# Patient Record
Sex: FEMALE | Race: WHITE | Employment: FULL TIME | URBAN - METROPOLITAN AREA
[De-identification: names, ages, dates, MRNs, and addresses within clinical notes are randomized per-mention and may not be internally consistent; named-entity substitution may affect disease eponyms.]

---

## 2017-02-06 ENCOUNTER — OFFICE VISIT (OUTPATIENT)
Dept: FAMILY MEDICINE CLINIC | Age: 62
End: 2017-02-06

## 2017-02-06 ENCOUNTER — TELEPHONE (OUTPATIENT)
Dept: FAMILY MEDICINE CLINIC | Age: 62
End: 2017-02-06

## 2017-02-06 VITALS
HEART RATE: 83 BPM | BODY MASS INDEX: 24.12 KG/M2 | DIASTOLIC BLOOD PRESSURE: 78 MMHG | TEMPERATURE: 98.5 F | WEIGHT: 144.8 LBS | OXYGEN SATURATION: 98 % | RESPIRATION RATE: 22 BRPM | SYSTOLIC BLOOD PRESSURE: 120 MMHG | HEIGHT: 65 IN

## 2017-02-06 DIAGNOSIS — M54.41 ACUTE RIGHT-SIDED LOW BACK PAIN WITH RIGHT-SIDED SCIATICA: Primary | ICD-10-CM

## 2017-02-06 RX ORDER — ALENDRONATE SODIUM 70 MG/1
TABLET ORAL
COMMUNITY

## 2017-02-06 RX ORDER — HYDROCODONE BITARTRATE AND ACETAMINOPHEN 5; 325 MG/1; MG/1
1 TABLET ORAL
Qty: 30 TAB | Refills: 0 | Status: SHIPPED | OUTPATIENT
Start: 2017-02-06

## 2017-02-06 RX ORDER — PREDNISONE 10 MG/1
TABLET ORAL
Qty: 21 TAB | Refills: 0 | Status: ON HOLD | OUTPATIENT
Start: 2017-02-06 | End: 2017-02-17

## 2017-02-06 NOTE — MR AVS SNAPSHOT
Visit Information Date & Time Provider Department Dept. Phone Encounter #  
 2/6/2017 11:45 AM Shan Salazar, Radha E Constantin  [phone] [id number] Upcoming Health Maintenance Date Due Hepatitis C Screening 1955 DTaP/Tdap/Td series (1 - Tdap) 5/22/1978 BREAST CANCER SCRN MAMMOGRAM 10/17/2014 PAP AKA CERVICAL CYTOLOGY 10/24/2015 INFLUENZA AGE 9 TO ADULT 8/1/2016 COLONOSCOPY 8/29/2026 Allergies as of 2/6/2017  Review Complete On: 2/6/2017 By: Shan Salazar MD  
  
 Severity Noted Reaction Type Reactions Codeine  07/03/2015    Nausea and Vomiting Current Immunizations  Never Reviewed No immunizations on file. Not reviewed this visit You Were Diagnosed With   
  
 Codes Comments Acute right-sided low back pain with right-sided sciatica    -  Primary ICD-10-CM: M54.41 
ICD-9-CM: 724.2, 724.3 Vitals BP Pulse Temp Resp Height(growth percentile) Weight(growth percentile) 120/78 (BP 1 Location: Left arm, BP Patient Position: Sitting) 83 98.5 °F (36.9 °C) (Oral) 22 5' 5\" (1.651 m) 144 lb 12.8 oz (65.7 kg) SpO2 BMI OB Status Smoking Status 98% 24.1 kg/m2 Postmenopausal Never Smoker BMI and BSA Data Body Mass Index Body Surface Area  
 24.1 kg/m 2 1.74 m 2 Preferred Pharmacy Pharmacy Name Phone [facility] [address] [phone] Your Updated Medication List  
  
   
This list is accurate as of: 2/6/17 12:36 PM.  Always use your most recent med list.  
  
  
  
  
 ascorbic acid (vitamin C) 500 mg tablet Commonly known as:  VITAMIN C Take  by mouth.  
  
 calcium 600 mg Cap Take  by mouth. CENTRUM PO Take  by mouth. FISH OIL 1,000 mg Cap Generic drug:  omega-3 fatty acids-vitamin e Take 1 Cap by mouth. FOSAMAX 70 mg tablet Generic drug:  alendronate Take  by mouth every seven (7) days. HYDROcodone-acetaminophen 5-325 mg per tablet Commonly known as:  Mely Arts Take 1 Tab by mouth two (2) times daily as needed for Pain. Max Daily Amount: 2 Tabs. predniSONE 10 mg tablet Commonly known as:  DELTASONE  
40 mg daily x 2 days, 30 mg daily x 2 days, 20 mg daily x 2 days, 10 mg daily x 2 days, 5 mg daily x 2 days. VITAMIN D3 1,000 unit Cap Generic drug:  cholecalciferol Take  by mouth. Prescriptions Printed Refills HYDROcodone-acetaminophen (NORCO) 5-325 mg per tablet 0 Sig: Take 1 Tab by mouth two (2) times daily as needed for Pain. Max Daily Amount: 2 Tabs. Class: Print Route: Oral  
  
Prescriptions Sent to Pharmacy Refills  
 predniSONE (DELTASONE) 10 mg tablet 0 Si mg daily x 2 days, 30 mg daily x 2 days, 20 mg daily x 2 days, 10 mg daily x 2 days, 5 mg daily x 2 days. Class: Normal  
 Pharmacy: [id number] [address] [place] [address] Ph #: [phone] To-Do List   
 2017 Imaging:  MRI LUMB SPINE WO CONT   
  
 2017 Imaging:  XR SPINE LUMB 2 OR 3 V Patient Instructions Sciatica: Care Instructions Your Care Instructions Sciatica (say \"vxx-DG-cl-kuh\") is an irritation of one of the sciatic nerves, which come from the spinal cord in the lower back. The sciatic nerves and their branches extend down through the buttock to the foot. Sciatica can develop when an injured disc in the back presses against a spinal nerve root. Its main symptom is pain, numbness, or weakness that is often worse in the leg or foot than in the back. Sciatica often will improve and go away with time. Early treatment usually includes medicines and exercises to relieve pain. Follow-up care is a key part of your treatment and safety. Be sure to make and go to all appointments, and call your doctor if you are having problems.  It's also a good idea to know your test results and keep a list of the medicines you take. How can you care for yourself at home? · Take pain medicines exactly as directed. ¨ If the doctor gave you a prescription medicine for pain, take it as prescribed. ¨ If you are not taking a prescription pain medicine, ask your doctor if you can take an over-the-counter medicine. · Use heat or ice to relieve pain. ¨ To apply heat, put a warm water bottle, heating pad set on low, or warm cloth on your back. Do not go to sleep with a heating pad on your skin. ¨ To use ice, put ice or a cold pack on the area for 10 to 20 minutes at a time. Put a thin cloth between the ice and your skin. · Avoid sitting if possible, unless it feels better than standing. · Alternate lying down with short walks. Increase your walking distance as you are able to without making your symptoms worse. · Do not do anything that makes your symptoms worse. When should you call for help? Call 911 anytime you think you may need emergency care. For example, call if: 
· You are unable to move a leg at all. Call your doctor now or seek immediate medical care if: 
· You have new or worse symptoms in your legs or buttocks. Symptoms may include: ¨ Numbness or tingling. ¨ Weakness. ¨ Pain. · You lose bladder or bowel control. Watch closely for changes in your health, and be sure to contact your doctor if: 
· You are not getting better as expected. Where can you learn more? Go to http://julien-rekha.info/. Enter [phone] in the search box to learn more about \"Sciatica: Care Instructions. \" Current as of: May 23, 2016 Content Version: 11.1 © 7948-8662 [employer]. Care instructions adapted under license by [employer] (which disclaims liability or warranty for this information).  If you have questions about a medical condition or this instruction, always ask your healthcare professional. [address] any warranty or liability for your use of this information. Introducing [facility]! Timothymaxime Thomas introduces RadioFrame patient portal. Now you can access parts of your medical record, email your doctor's office, and request medication refills online. 1. In your internet browser, go to [email or web address] 2. Click on the First Time User? Click Here link in the Sign In box. You will see the New Member Sign Up page. 3. Enter your RadioFrame Access Code exactly as it appears below. You will not need to use this code after youve completed the sign-up process. If you do not sign up before the expiration date, you must request a new code. · RadioFrame Access Code: [facility] Expires: 5/7/2017 12:36 PM 
 
4. Enter the last four digits of your Social Security Number (xxxx) and Date of Birth (mm/dd/yyyy) as indicated and click Submit. You will be taken to the next sign-up page. 5. Create a RadioFrame ID. This will be your RadioFrame login ID and cannot be changed, so think of one that is secure and easy to remember. 6. Create a RadioFrame password. You can change your password at any time. 7. Enter your Password Reset Question and Answer. This can be used at a later time if you forget your password. 8. Enter your e-mail address. You will receive e-mail notification when new information is available in [address] 9. Click Sign Up. You can now view and download portions of your medical record. 10. Click the Download Summary menu link to download a portable copy of your medical information. If you have questions, please visit the Frequently Asked Questions section of the RadioFrame website. Remember, RadioFrame is NOT to be used for urgent needs. For medical emergencies, dial 911. Now available from your iPhone and Android! Please provide this summary of care documentation to your next provider. Your primary care clinician is listed as [address].  If you have any questions after today's visit, please call [phone].

## 2017-02-06 NOTE — PROGRESS NOTES
Alyse Mccrary is a 63 y.o. female  Chief Complaint   Patient presents with    Leg Pain     right leg pain x2 months     1. Have you been to the ER, urgent care clinic since your last visit? Hospitalized since your last visit? No    2. Have you seen or consulted any other health care providers outside of the [address] [place] [address] since your last visit? Include any pap smears or colon screening.  No

## 2017-02-06 NOTE — PROGRESS NOTES
Assessment/Plan:    Ramses Kwong was seen today for leg pain. Diagnoses and all orders for this visit:    Acute right-sided low back pain with right-sided sciatica  -     predniSONE (DELTASONE) 10 mg tablet; 40 mg daily x 2 days, 30 mg daily x 2 days, 20 mg daily x 2 days, 10 mg daily x 2 days, 5 mg daily x 2 days.  -     HYDROcodone-acetaminophen (NORCO) 5-325 mg per tablet; Take 1 Tab by mouth two (2) times daily as needed for Pain. Max Daily Amount: 2 Tabs. -     XR SPINE LUMB 2 OR 3 V; Future  -     MRI LUMB SPINE WO CONT; Future      Pt will return to f/u after MRI. The plan was discussed with the patient. The patient verbalized understanding and is in agreement with the plan. All medication potential side effects were discussed with the patient.    -------------------------------------------------------------------------------------------------------------------        Martín Larry is a 63 y.o. female and presents with Leg Pain (right leg pain x2 months)         Subjective:  Pt having a RT hip and leg pain since Dec 2016. Thought she pulled a muscle, was using tiger balm. Then this past Friday, pain increased considerably. She saw a chiropractor on Friday as well. They did xrays in their office and said \"she had several compacted discs in back\". ROS:  Constitutional: No recent weight change. No weakness/fatigue. No f/c. Skin: No rashes, change in nails/hair, itching   HENT: No HA, dizziness. No hearing loss/tinnitus. No nasal congestion/discharge. Eyes: No change in vision, double/blurred vision or eye pain/redness. Cardiovascular: No CP/palpitations. No NICHOLS/orthopnea/PND. Respiratory: No cough/sputum, dyspnea, wheezing. Gastointestinal: No dysphagia, reflux. No n/v. No constipation/diarrhea. No melena/rectal bleeding. Genitourinary: No dysuria, urinary hesitancy, nocturia, hematuria. No incontinence. Musculoskeletal: No joint pain/stiffness. No muscle pain/tenderness. Endo: No heat/cold intolerance, no polyuria/polydypsia. Heme: No h/o anemia. No easy bleeding/bruising. Allergy/Immunology: No seasonal rhinitis. Denies frequent colds, sinus/ear infections. Neurological: No seizures/numbness/weakness. No paresthesias. Psychiatric:  No depression, anxiety. The problem list was updated as a part of today's visit. Patient Active Problem List   Diagnosis Code    FH: dementia Z81.8       The PSH, FH were reviewed. SH:  Social History   Substance Use Topics    Smoking status: Never Smoker    Smokeless tobacco: Never Used    Alcohol use Yes      Comment: soc       Medications/Allergies:  Current Outpatient Prescriptions on File Prior to Visit   Medication Sig Dispense Refill    Cholecalciferol, Vitamin D3, (VITAMIN D3) 1,000 unit cap Take  by mouth.  ascorbic acid (VITAMIN C) 500 mg tablet Take  by mouth.  calcium 600 mg cap Take  by mouth.  omega-3 fatty acids-vitamin e (FISH OIL) 1,000 mg Cap Take 1 Cap by mouth.  MULTIVITS W-FE,OTHER MIN (CENTRUM PO) Take  by mouth. No current facility-administered medications on file prior to visit. Allergies   Allergen Reactions    Codeine Nausea and Vomiting         Health Maintenance:   Health Maintenance   Topic Date Due    Hepatitis C Screening  1955    DTaP/Tdap/Td series (1 - Tdap) 05/22/1978    BREAST CANCER SCRN MAMMOGRAM  10/17/2014    PAP AKA CERVICAL CYTOLOGY  10/24/2015    INFLUENZA AGE 9 TO ADULT  08/01/2016    COLONOSCOPY  08/29/2026       Objective:  Visit Vitals    /78 (BP 1 Location: Left arm, BP Patient Position: Sitting)    Pulse 83    Temp 98.5 °F (36.9 °C) (Oral)    Resp 22    Ht 5' 5\" (1.651 m)    Wt 144 lb 12.8 oz (65.7 kg)    SpO2 98%    BMI 24.1 kg/m2          Nurses notes and VS reviewed.       Physical Examination: General appearance - alert, well appearing, and in no distress  Chest - clear to auscultation, no wheezes, rales or rhonchi, symmetric air entry  Heart - normal rate, regular rhythm, normal S1, S2, no murmurs, rubs, clicks or gallops  Musculoskeletal - + straight leg raise        Labwork and Ancillary Studies:    CBC w/Diff  Lab Results   Component Value Date/Time    WBC 6.1 06/29/2011 12:00 AM    HGB 13.9 06/29/2011 12:00 AM    PLATELET 651 38/73/1652 12:00 AM         Basic Metabolic Profile  Lab Results   Component Value Date/Time    Sodium 139 06/29/2011 12:00 AM    Potassium 4.7 06/29/2011 12:00 AM    Chloride 103 06/29/2011 12:00 AM    CO2 25 06/29/2011 12:00 AM    Glucose 87 06/29/2011 12:00 AM    BUN 19 06/29/2011 12:00 AM    Creatinine 0.85 06/29/2011 12:00 AM    BUN/Creatinine ratio 22 06/29/2011 12:00 AM    GFR est AA 90 06/29/2011 12:00 AM    GFR est non-AA 78 06/29/2011 12:00 AM    Calcium 9.1 06/29/2011 12:00 AM         LFT  Lab Results   Component Value Date/Time    ALT (SGPT) 20 06/29/2011 12:00 AM    AST (SGOT) 28 06/29/2011 12:00 AM    Alk.  phosphatase 71 06/29/2011 12:00 AM    Bilirubin, direct 0.27 06/29/2011 12:00 AM    Bilirubin, total 1.0 06/29/2011 12:00 AM         Cholesterol  Lab Results   Component Value Date/Time    Cholesterol, total 206 06/29/2011 12:00 AM    HDL Cholesterol 99 06/29/2011 12:00 AM    LDL, calculated 96 06/29/2011 12:00 AM    Triglyceride 55 06/29/2011 12:00 AM

## 2017-02-06 NOTE — TELEPHONE ENCOUNTER
Pt was seen today and was given an order for an MRI. Pt wants to get the MRI done today or tomorrow and she called the [phone] number and they state that they would be able to do that today if we gave them the authorization number. Otherwise, she would have to wait for [place] and that could take 5 days or so. Please call the pt back if you are unable to get the authorization number.

## 2017-02-06 NOTE — PATIENT INSTRUCTIONS
Sciatica: Care Instructions  Your Care Instructions    Sciatica (say \"izm-VO-fc-kuh\") is an irritation of one of the sciatic nerves, which come from the spinal cord in the lower back. The sciatic nerves and their branches extend down through the buttock to the foot. Sciatica can develop when an injured disc in the back presses against a spinal nerve root. Its main symptom is pain, numbness, or weakness that is often worse in the leg or foot than in the back. Sciatica often will improve and go away with time. Early treatment usually includes medicines and exercises to relieve pain. Follow-up care is a key part of your treatment and safety. Be sure to make and go to all appointments, and call your doctor if you are having problems. It's also a good idea to know your test results and keep a list of the medicines you take. How can you care for yourself at home? · Take pain medicines exactly as directed. ¨ If the doctor gave you a prescription medicine for pain, take it as prescribed. ¨ If you are not taking a prescription pain medicine, ask your doctor if you can take an over-the-counter medicine. · Use heat or ice to relieve pain. ¨ To apply heat, put a warm water bottle, heating pad set on low, or warm cloth on your back. Do not go to sleep with a heating pad on your skin. ¨ To use ice, put ice or a cold pack on the area for 10 to 20 minutes at a time. Put a thin cloth between the ice and your skin. · Avoid sitting if possible, unless it feels better than standing. · Alternate lying down with short walks. Increase your walking distance as you are able to without making your symptoms worse. · Do not do anything that makes your symptoms worse. When should you call for help? Call 911 anytime you think you may need emergency care. For example, call if:  · You are unable to move a leg at all.   Call your doctor now or seek immediate medical care if:  · You have new or worse symptoms in your legs or buttocks. Symptoms may include:  ¨ Numbness or tingling. ¨ Weakness. ¨ Pain. · You lose bladder or bowel control. Watch closely for changes in your health, and be sure to contact your doctor if:  · You are not getting better as expected. Where can you learn more? Go to http://julien-rekha.info/. Enter [phone] in the search box to learn more about \"Sciatica: Care Instructions. \"  Current as of: May 23, 2016  Content Version: 11.1  © 3771-8400 [employer]. Care instructions adapted under license by [employer] (which disclaims liability or warranty for this information). If you have questions about a medical condition or this instruction, always ask your healthcare professional. [address] any warranty or liability for your use of this information.

## 2017-02-07 ENCOUNTER — TELEPHONE (OUTPATIENT)
Dept: FAMILY MEDICINE CLINIC | Age: 62
End: 2017-02-07

## 2017-02-07 NOTE — TELEPHONE ENCOUNTER
Pt called again in regards to the MRI. She wanted the number to [facility] which she called and they said that if Dr Tigre Moran changed the order to stat she could get it done sooner than Sunday. That was not the main reason why she called she wanted to know if the notes or order was faxed over to them yet and the state that they had not received anything from us. Pt is frustrated because she is in pain and wants to get this done as soon as possible so she can get back to work. She states that she needs to work and she can't just sit at home. Please let me know if we can change the order to stat and if not please let me know when you fax over the order/note to [facility] and have confirmation. Pt's wants me to call her back as soon as possible with the status.

## 2017-02-07 NOTE — TELEPHONE ENCOUNTER
Pt called again after Rosa had already left for the day. She wanted Dr. Ambar Crockett to call in the MRI order from home.  I let her know that she would have to wait until tomorrow for any orders from Dr. Ambar Crockett

## 2017-02-08 NOTE — TELEPHONE ENCOUNTER
Per Dr Dawson Silva, a STAT order is not necessary at this time. Spoke with Miroslava patient is scheduled for 02/11/17 at 9am they received order and notes from last visit, they do not need anything else from us.

## 2017-02-10 RX ORDER — CYCLOBENZAPRINE HCL 5 MG
5 TABLET ORAL
Qty: 40 TAB | Refills: 0 | Status: SHIPPED | OUTPATIENT
Start: 2017-02-10

## 2017-02-16 DIAGNOSIS — M54.41 ACUTE RIGHT-SIDED LOW BACK PAIN WITH RIGHT-SIDED SCIATICA: ICD-10-CM

## 2019-04-10 ENCOUNTER — HOSPITAL ENCOUNTER (OUTPATIENT)
Dept: LAB | Age: 64
Discharge: HOME | End: 2019-04-10
Payer: SELF-PAY

## 2019-04-10 ENCOUNTER — OFFICE VISIT (OUTPATIENT)
Dept: FAMILY MEDICINE CLINIC | Age: 64
End: 2019-04-10

## 2019-04-10 VITALS
BODY MASS INDEX: 23.78 KG/M2 | TEMPERATURE: 97.8 F | HEIGHT: 66 IN | WEIGHT: 148 LBS | DIASTOLIC BLOOD PRESSURE: 68 MMHG | OXYGEN SATURATION: 97 % | HEART RATE: 71 BPM | RESPIRATION RATE: 16 BRPM | SYSTOLIC BLOOD PRESSURE: 102 MMHG

## 2019-04-10 DIAGNOSIS — Z23 ENCOUNTER FOR IMMUNIZATION: ICD-10-CM

## 2019-04-10 DIAGNOSIS — Z81.8 FAMILY HISTORY OF FIRST DEGREE RELATIVE WITH DEMENTIA: ICD-10-CM

## 2019-04-10 DIAGNOSIS — Z00.00 ANNUAL PHYSICAL EXAM: ICD-10-CM

## 2019-04-10 DIAGNOSIS — Z11.59 NEED FOR HEPATITIS C SCREENING TEST: ICD-10-CM

## 2019-04-10 DIAGNOSIS — Z78.0 POSTMENOPAUSAL: ICD-10-CM

## 2019-04-10 DIAGNOSIS — Z00.00 ANNUAL PHYSICAL EXAM: Primary | ICD-10-CM

## 2019-04-10 LAB
ALBUMIN SERPL-MCNC: 3.7 G/DL (ref 3.4–5)
ALBUMIN/GLOB SERPL: 1.5 {RATIO} (ref 0.8–1.7)
ALP SERPL-CCNC: 74 U/L (ref 45–117)
ALT SERPL-CCNC: 23 U/L (ref 13–56)
ANION GAP SERPL CALC-SCNC: 6 MMOL/L (ref 3–18)
APPEARANCE UR: CLEAR
AST SERPL-CCNC: 15 U/L (ref 15–37)
BACTERIA URNS QL MICRO: NEGATIVE /HPF
BASOPHILS # BLD: 0.1 K/UL (ref 0–0.1)
BASOPHILS NFR BLD: 1 % (ref 0–2)
BILIRUB DIRECT SERPL-MCNC: 0.2 MG/DL (ref 0–0.2)
BILIRUB SERPL-MCNC: 0.8 MG/DL (ref 0.2–1)
BILIRUB UR QL: NEGATIVE
BUN SERPL-MCNC: 15 MG/DL (ref 7–18)
BUN/CREAT SERPL: 18 (ref 12–20)
CALCIUM SERPL-MCNC: 8.4 MG/DL (ref 8.5–10.1)
CHLORIDE SERPL-SCNC: 109 MMOL/L (ref 100–108)
CHOLEST SERPL-MCNC: 204 MG/DL
CO2 SERPL-SCNC: 29 MMOL/L (ref 21–32)
COLOR UR: YELLOW
CREAT SERPL-MCNC: 0.83 MG/DL (ref 0.6–1.3)
DIFFERENTIAL METHOD BLD: ABNORMAL
EOSINOPHIL # BLD: 0.2 K/UL (ref 0–0.4)
EOSINOPHIL NFR BLD: 4 % (ref 0–5)
EPITH CASTS URNS QL MICRO: NORMAL /LPF (ref 0–5)
ERYTHROCYTE [DISTWIDTH] IN BLOOD BY AUTOMATED COUNT: 14.6 % (ref 11.6–14.5)
GLOBULIN SER CALC-MCNC: 2.5 G/DL (ref 2–4)
GLUCOSE SERPL-MCNC: 69 MG/DL (ref 74–99)
GLUCOSE UR STRIP.AUTO-MCNC: NEGATIVE MG/DL
HCT VFR BLD AUTO: 42.6 % (ref 35–45)
HDLC SERPL-MCNC: 99 MG/DL (ref 40–60)
HDLC SERPL: 2.1 {RATIO} (ref 0–5)
HGB BLD-MCNC: 13.1 G/DL (ref 12–16)
HGB UR QL STRIP: NEGATIVE
KETONES UR QL STRIP.AUTO: NEGATIVE MG/DL
LDLC SERPL CALC-MCNC: 94.6 MG/DL (ref 0–100)
LEUKOCYTE ESTERASE UR QL STRIP.AUTO: ABNORMAL
LIPID PROFILE,FLP: ABNORMAL
LYMPHOCYTES # BLD: 2.3 K/UL (ref 0.9–3.6)
LYMPHOCYTES NFR BLD: 40 % (ref 21–52)
MCH RBC QN AUTO: 28.9 PG (ref 24–34)
MCHC RBC AUTO-ENTMCNC: 30.8 G/DL (ref 31–37)
MCV RBC AUTO: 93.8 FL (ref 74–97)
MONOCYTES # BLD: 0.5 K/UL (ref 0.05–1.2)
MONOCYTES NFR BLD: 8 % (ref 3–10)
NEUTS SEG # BLD: 2.8 K/UL (ref 1.8–8)
NEUTS SEG NFR BLD: 47 % (ref 40–73)
NITRITE UR QL STRIP.AUTO: NEGATIVE
PH UR STRIP: 5.5 [PH] (ref 5–8)
PLATELET # BLD AUTO: 262 K/UL (ref 135–420)
PMV BLD AUTO: 9.7 FL (ref 9.2–11.8)
POTASSIUM SERPL-SCNC: 4.7 MMOL/L (ref 3.5–5.5)
PROT SERPL-MCNC: 6.2 G/DL (ref 6.4–8.2)
PROT UR STRIP-MCNC: NEGATIVE MG/DL
RBC # BLD AUTO: 4.54 M/UL (ref 4.2–5.3)
RBC #/AREA URNS HPF: 0 /HPF (ref 0–5)
SODIUM SERPL-SCNC: 144 MMOL/L (ref 136–145)
SP GR UR REFRACTOMETRY: 1.02 (ref 1–1.03)
T4 FREE SERPL-MCNC: 0.9 NG/DL (ref 0.7–1.5)
TRIGL SERPL-MCNC: 52 MG/DL (ref ?–150)
TSH SERPL DL<=0.05 MIU/L-ACNC: 1.7 UIU/ML (ref 0.36–3.74)
UROBILINOGEN UR QL STRIP.AUTO: 0.2 EU/DL (ref 0.2–1)
VLDLC SERPL CALC-MCNC: 10.4 MG/DL
WBC # BLD AUTO: 5.9 K/UL (ref 4.6–13.2)
WBC URNS QL MICRO: NORMAL /HPF (ref 0–4)

## 2019-04-10 PROCEDURE — 84439 ASSAY OF FREE THYROXINE: CPT

## 2019-04-10 PROCEDURE — 81001 URINALYSIS AUTO W/SCOPE: CPT

## 2019-04-10 PROCEDURE — 82306 VITAMIN D 25 HYDROXY: CPT

## 2019-04-10 PROCEDURE — 36415 COLL VENOUS BLD VENIPUNCTURE: CPT

## 2019-04-10 PROCEDURE — 84443 ASSAY THYROID STIM HORMONE: CPT

## 2019-04-10 PROCEDURE — 80061 LIPID PANEL: CPT

## 2019-04-10 PROCEDURE — 86803 HEPATITIS C AB TEST: CPT

## 2019-04-10 PROCEDURE — 85025 COMPLETE CBC W/AUTO DIFF WBC: CPT

## 2019-04-10 PROCEDURE — 80076 HEPATIC FUNCTION PANEL: CPT

## 2019-04-10 PROCEDURE — 80048 BASIC METABOLIC PNL TOTAL CA: CPT

## 2019-04-10 RX ORDER — GLUCOSAMINE/CHONDR SU A SOD 750-600 MG
TABLET ORAL DAILY
COMMUNITY

## 2019-04-10 NOTE — PATIENT INSTRUCTIONS
Well Visit, Women 48 to 72: Care Instructions Your Care Instructions Physical exams can help you stay healthy. Your doctor has checked your overall health and may have suggested ways to take good care of yourself. He or she also may have recommended tests. At home, you can help prevent illness with healthy eating, regular exercise, and other steps. Follow-up care is a key part of your treatment and safety. Be sure to make and go to all appointments, and call your doctor if you are having problems. It's also a good idea to know your test results and keep a list of the medicines you take. How can you care for yourself at home? · Reach and stay at a healthy weight. This will lower your risk for many problems, such as obesity, diabetes, heart disease, and high blood pressure. · Get at least 30 minutes of exercise on most days of the week. Walking is a good choice. You also may want to do other activities, such as running, swimming, cycling, or playing tennis or team sports. · Do not smoke. Smoking can make health problems worse. If you need help quitting, talk to your doctor about stop-smoking programs and medicines. These can increase your chances of quitting for good. · Protect your skin from too much sun. When you're outdoors from 10 a.m. to 4 p.m., stay in the shade or cover up with clothing and a hat with a wide brim. Wear sunglasses that block UV rays. Even when it's cloudy, put broad-spectrum sunscreen (SPF 30 or higher) on any exposed skin. · See a dentist one or two times a year for checkups and to have your teeth cleaned. · Wear a seat belt in the car. · Limit alcohol to 1 drink a day. Too much alcohol can cause health problems. Follow your doctor's advice about when to have certain tests. These tests can spot problems early. · Cholesterol.  Your doctor will tell you how often to have this done based on your age, family history, or other things that can increase your risk for heart attack and stroke. · Blood pressure. Have your blood pressure checked during a routine doctor visit. Your doctor will tell you how often to check your blood pressure based on your age, your blood pressure results, and other factors. · Mammogram. Ask your doctor how often you should have a mammogram, which is an X-ray of your breasts. A mammogram can spot breast cancer before it can be felt and when it is easiest to treat. · Pap test and pelvic exam. Ask your doctor how often you should have a Pap test. You may not need to have a Pap test as often as you used to. · Vision. Have your eyes checked every year or two or as often as your doctor suggests. Some experts recommend that you have yearly exams for glaucoma and other age-related eye problems starting at age 48. · Hearing. Tell your doctor if you notice any change in your hearing. You can have tests to find out how well you hear. · Diabetes. Ask your doctor whether you should have tests for diabetes. · Colon cancer. You should begin tests for colon cancer at age 48. You may have one of several tests. Your doctor will tell you how often to have tests based on your age and risk. Risks include whether you already had a precancerous polyp removed from your colon or whether your parents, sisters and brothers, or children have had colon cancer. · Thyroid disease. Talk to your doctor about whether to have your thyroid checked as part of a regular physical exam. Women have an increased chance of a thyroid problem. · Osteoporosis. You should begin tests for bone density at age 72. If you are younger than 72, ask your doctor whether you have factors that may increase your risk for this disease. You may want to have this test before age 72. · Heart attack and stroke risk. At least every 4 to 6 years, you should have your risk for heart attack and stroke assessed.  Your doctor uses factors such as your age, blood pressure, cholesterol, and whether you smoke or have diabetes to show what your risk for a heart attack or stroke is over the next 10 years. When should you call for help? Watch closely for changes in your health, and be sure to contact your doctor if you have any problems or symptoms that concern you. Where can you learn more? Go to http://julien-rekha.info/. Enter S221 in the search box to learn more about \"Well Visit, Women 50 to 72: Care Instructions. \" Current as of: March 28, 2018 Content Version: 11.9 © 9082-9235 [employer]. Care instructions adapted under license by [employer] (which disclaims liability or warranty for this information). If you have questions about a medical condition or this instruction, always ask your healthcare professional. [address] any warranty or liability for your use of this information.

## 2019-04-10 NOTE — PROGRESS NOTES
Arcadio Borjas is a 66 y.o. female (: 1955) presenting to address: Chief Complaint Patient presents with  Complete Physical  
  Ojji1zaed testing . Mammogram   
 Other  
  continue fosamax ? Vitals:  
 04/10/19 0848 BP: 102/68 Pulse: 71 Resp: 16 Temp: 97.8 °F (36.6 °C) TempSrc: Oral  
SpO2: 97% Weight: 148 lb (67.1 kg) Height: 5' 6\" (1.676 m) PainSc:   0 - No pain Hearing/Vision:  
 
 Visual Acuity Screening Right eye Left eye Both eyes Without correction:     
With correction: 20/20 20/20 20/20 Learning Assessment:  
 
Learning Assessment 2013 PRIMARY LEARNER Patient PRIMARY LANGUAGE ENGLISH  
LEARNER PREFERENCE PRIMARY VIDEOS  
ANSWERED BY Patient RELATIONSHIP SELF Depression Screening:  
 
3 most recent PHQ Screens 4/10/2019 Little interest or pleasure in doing things Not at all Feeling down, depressed, irritable, or hopeless Not at all Total Score PHQ 2 0 Fall Risk Assessment:  
 
Fall Risk Assessment, last 12 mths 4/10/2019 Able to walk? Yes Fall in past 12 months? No  
 
Abuse Screening:  
 
Abuse Screening Questionnaire 4/10/2019 Do you ever feel afraid of your partner? Vicki Jesús Are you in a relationship with someone who physically or mentally threatens you? Vicki Jesús Is it safe for you to go home? Mariano Mello Coordination of Care Questionaire: 1. Have you been to the ER, urgent care clinic since your last visit? Hospitalized since your last visit? NO 
 
2. Have you seen or consulted any other health care providers outside of the [address] [place] [address] since your last visit? Include any pap smears or colon screening. NO Advanced Directive: 1. Do you have an Advanced Directive? NO 
 
2. Would you like information on Advanced Directives?  NO

## 2019-04-10 NOTE — PROGRESS NOTES
Immunization/s administered 4/10/2019 by Makayla Vinson LPN with guardian's consent. Patient tolerated procedure well. No reactions noted. Shingrix #1 left deltoid, TDAP right deltoid.

## 2019-04-10 NOTE — PROGRESS NOTES
SUBJECTIVE:  
66 y.o. female for annual routine checkup. Pt is particularly interested in ApoE 4 gene testing. Her mother was diagnosed with Alzheimer's at age 76. Pt has noted some mild issues with being forgetful so it has made her concerned. Current Outpatient Medications Medication Sig Dispense Refill  Biotin 2,500 mcg cap Take  by mouth daily.  alendronate (FOSAMAX) 70 mg tablet Take  by mouth every seven (7) days.  Cholecalciferol, Vitamin D3, (VITAMIN D3) 1,000 unit cap Take  by mouth.  calcium 600 mg cap Take  by mouth.  omega-3 fatty acids-vitamin e (FISH OIL) 1,000 mg Cap Take 1 Cap by mouth.  cyclobenzaprine (FLEXERIL) 5 mg tablet Take 1 Tab by mouth two (2) times daily as needed for Muscle Spasm(s). 40 Tab 0  
 HYDROcodone-acetaminophen (NORCO) 5-325 mg per tablet Take 1 Tab by mouth two (2) times daily as needed for Pain. Max Daily Amount: 2 Tabs. 30 Tab 0  
 ascorbic acid (VITAMIN C) 500 mg tablet Take  by mouth.  MULTIVITS W-FE,OTHER MIN (CENTRUM PO) Take  by mouth. Past Medical History:  
Diagnosis Date  FH: dementia 10/8/2013  Ovarian carcinoma ([facility] [address] 2014 Dr. Sandro Allen Allergies: Codeine No LMP recorded. Patient has had a hysterectomy. ROS:  Feeling well. No dyspnea or chest pain on exertion. No abdominal pain, change in bowel habits, black or bloody stools. No urinary tract symptoms. GYN ROS: no breast pain or new or enlarging lumps on self exam. No neurological complaints. OBJECTIVE: The patient appears well, alert, oriented x 3, in no distress. Visit Vitals /68 (BP 1 Location: Left arm, BP Patient Position: Sitting) Pulse 71 Temp 97.8 °F (36.6 °C) (Oral) Resp 16 Ht 5' 6\" (1.676 m) Wt 148 lb (67.1 kg) SpO2 97% BMI 23.89 kg/m² General appearance: alert, cooperative, no distress, appears stated age Head: Normocephalic, without obvious abnormality, atraumatic Ears: normal TM's and external ear canals AU Throat: Lips, mucosa, and tongue normal. Teeth and gums normal 
Neck: supple, symmetrical, trachea midline, no adenopathy, thyroid: not enlarged, symmetric, no tenderness/mass/nodules, no carotid bruit and no JVD Back: symmetric, no curvature. ROM normal. No CVA tenderness. Lungs: clear to auscultation bilaterally Breasts: patient declines to have breast exam. 
Heart: regular rate and rhythm, S1, S2 normal, no murmur, click, rub or gallop Abdomen: soft, non-tender. Bowel sounds normal. No masses,  no organomegaly Extremities: extremities normal, atraumatic, no cyanosis or edema Pulses: 2+ and symmetric Skin: Skin color, texture, turgor normal. No rashes or lesions Neurological is normal, no focal findings. Lab Results Component Value Date/Time WBC 6.1 06/29/2011 12:00 AM  
 HGB 13.9 06/29/2011 12:00 AM  
 HCT 41.8 06/29/2011 12:00 AM  
 PLATELET 255 20/87/6176 12:00 AM  
 MCV 88 06/29/2011 12:00 AM  
 
 
 
Lab Results Component Value Date/Time Sodium 139 06/29/2011 12:00 AM  
 Potassium 4.7 06/29/2011 12:00 AM  
 Chloride 103 06/29/2011 12:00 AM  
 CO2 25 06/29/2011 12:00 AM  
 Glucose 87 06/29/2011 12:00 AM  
 BUN 19 06/29/2011 12:00 AM  
 Creatinine 0.85 06/29/2011 12:00 AM  
 BUN/Creatinine ratio 22 06/29/2011 12:00 AM  
 GFR est AA 90 06/29/2011 12:00 AM  
 GFR est non-AA 78 06/29/2011 12:00 AM  
 Calcium 9.1 06/29/2011 12:00 AM  
 
 
 
Lab Results Component Value Date/Time ALT (SGPT) 20 06/29/2011 12:00 AM  
 AST (SGOT) 28 06/29/2011 12:00 AM  
 Alk. phosphatase 71 06/29/2011 12:00 AM  
 Bilirubin, direct 0.27 06/29/2011 12:00 AM  
 Bilirubin, total 1.0 06/29/2011 12:00 AM  
 
 
 
Lab Results Component Value Date/Time  Cholesterol, total 206 (H) 06/29/2011 12:00 AM  
 HDL Cholesterol 99 06/29/2011 12:00 AM  
 LDL, calculated 96 06/29/2011 12:00 AM  
 VLDL, calculated 11 06/29/2011 12:00 AM  
 Triglyceride 55 06/29/2011 12:00 AM  
 Lab Results Component Value Date/Time TSH 2.230 06/29/2011 12:00 AM  
 T4, Free 1.15 06/29/2011 12:00 AM  
 
 
 
Lab Results Component Value Date/Time Vitamin D 25-Hydroxy 31.7 (L) 06/29/2011 12:00 AM  
   
 
 
 
ASSESSMENT:  
Diagnoses and all orders for this visit: 
 
1. Annual physical exam 
-     CBC WITH AUTOMATED DIFF; Future 
-     HEPATIC FUNCTION PANEL; Future -     LIPID PANEL; Future -     METABOLIC PANEL, BASIC; Future 
-     TSH 3RD GENERATION; Future -     T4, FREE; Future -     URINALYSIS W/ RFLX MICROSCOPIC; Future -     VITAMIN D, 25 HYDROXY; Future 2. Need for hepatitis C screening test 
-     HEPATITIS C AB; Future 3. Postmenopausal 
-     DEXA BONE DENSITY STUDY AXIAL; Future 4. Encounter for immunization -     TETANUS, DIPHTHERIA TOXOIDS AND ACELLULAR PERTUSSIS VACCINE (TDAP), IN INDIVIDS. >=7, IM 
-     ZOSTER VACC RECOMBINANT ADJUVANTED 
-     HI IMMUNIZ ADMIN,1 SINGLE/COMB VAC/TOXOID 5. Family history of first degree relative with dementia 
-     REFERRAL TO NEUROLOGY PLAN:  
Mammogram: has been done. Colonoscopy: UTD. DEXA: has not had one in 5 years. Has been on Fosamax for that length of time. return annually or prn The plan was discussed with the patient. The patient verbalized understanding and is in agreement with the plan. All medication potential side effects were discussed with the patient.

## 2019-04-11 LAB
25(OH)D3 SERPL-MCNC: 38.1 NG/ML (ref 30–100)
HCV AB SER IA-ACNC: <0.02 INDEX
HCV AB SERPL QL IA: NEGATIVE
HCV COMMENT,HCGAC: NORMAL

## 2019-06-12 DIAGNOSIS — Z78.0 POSTMENOPAUSAL: ICD-10-CM

## 2019-06-24 ENCOUNTER — TELEPHONE (OUTPATIENT)
Dept: FAMILY MEDICINE CLINIC | Age: 64
End: 2019-06-24

## 2019-06-24 NOTE — TELEPHONE ENCOUNTER
Patient is requesting that her new bone density results be mailed to her. She wants it to compare to her 2013 Bone Density results.